# Patient Record
Sex: MALE | Race: BLACK OR AFRICAN AMERICAN | NOT HISPANIC OR LATINO | URBAN - METROPOLITAN AREA
[De-identification: names, ages, dates, MRNs, and addresses within clinical notes are randomized per-mention and may not be internally consistent; named-entity substitution may affect disease eponyms.]

---

## 2019-03-04 ENCOUNTER — OFFICE VISIT - HEALTHEAST (OUTPATIENT)
Dept: INTERNAL MEDICINE | Facility: CLINIC | Age: 29
End: 2019-03-04

## 2019-03-04 DIAGNOSIS — B00.9 HERPES SIMPLEX VIRUS INFECTION: ICD-10-CM

## 2019-03-04 DIAGNOSIS — N46.9 INFERTILITY MALE: ICD-10-CM

## 2019-03-04 DIAGNOSIS — Z00.00 HEALTHCARE MAINTENANCE: ICD-10-CM

## 2019-03-04 DIAGNOSIS — L91.0 KELOID SCAR: ICD-10-CM

## 2019-03-04 ASSESSMENT — MIFFLIN-ST. JEOR: SCORE: 2114.05

## 2019-03-05 ENCOUNTER — AMBULATORY - HEALTHEAST (OUTPATIENT)
Dept: LAB | Facility: CLINIC | Age: 29
End: 2019-03-05

## 2019-03-05 DIAGNOSIS — Z00.00 HEALTHCARE MAINTENANCE: ICD-10-CM

## 2019-03-05 LAB
ALBUMIN SERPL-MCNC: 3.9 G/DL (ref 3.5–5)
ALP SERPL-CCNC: 76 U/L (ref 45–120)
ALT SERPL W P-5'-P-CCNC: 13 U/L (ref 0–45)
ANION GAP SERPL CALCULATED.3IONS-SCNC: 11 MMOL/L (ref 5–18)
AST SERPL W P-5'-P-CCNC: 19 U/L (ref 0–40)
BILIRUB SERPL-MCNC: 0.5 MG/DL (ref 0–1)
BUN SERPL-MCNC: 12 MG/DL (ref 8–22)
CALCIUM SERPL-MCNC: 9.4 MG/DL (ref 8.5–10.5)
CHLORIDE BLD-SCNC: 101 MMOL/L (ref 98–107)
CHOLEST SERPL-MCNC: 118 MG/DL
CO2 SERPL-SCNC: 27 MMOL/L (ref 22–31)
CREAT SERPL-MCNC: 0.99 MG/DL (ref 0.7–1.3)
FASTING STATUS PATIENT QL REPORTED: YES
GFR SERPL CREATININE-BSD FRML MDRD: >60 ML/MIN/1.73M2
GLUCOSE BLD-MCNC: 74 MG/DL (ref 70–125)
HDLC SERPL-MCNC: 45 MG/DL
LDLC SERPL CALC-MCNC: 66 MG/DL
POTASSIUM BLD-SCNC: 3.7 MMOL/L (ref 3.5–5)
PROT SERPL-MCNC: 7.4 G/DL (ref 6–8)
SODIUM SERPL-SCNC: 139 MMOL/L (ref 136–145)
TRIGL SERPL-MCNC: 35 MG/DL

## 2019-03-06 ENCOUNTER — COMMUNICATION - HEALTHEAST (OUTPATIENT)
Dept: INTERNAL MEDICINE | Facility: CLINIC | Age: 29
End: 2019-03-06

## 2019-05-24 ENCOUNTER — OFFICE VISIT - HEALTHEAST (OUTPATIENT)
Dept: INTERNAL MEDICINE | Facility: CLINIC | Age: 29
End: 2019-05-24

## 2019-05-24 DIAGNOSIS — K13.0 ANGULAR CHEILITIS: ICD-10-CM

## 2019-05-24 RX ORDER — TRIAMCINOLONE ACETONIDE 1 MG/G
CREAM TOPICAL
Qty: 30 G | Refills: 0 | Status: SHIPPED | OUTPATIENT
Start: 2019-05-24

## 2019-05-24 RX ORDER — KETOCONAZOLE 20 MG/G
CREAM TOPICAL 2 TIMES DAILY
Qty: 15 G | Refills: 0 | Status: SHIPPED | OUTPATIENT
Start: 2019-05-24

## 2021-05-29 NOTE — PROGRESS NOTES
Office Visit   Javan Brewer   29 y.o. male    Date of Visit: 5/24/2019    Chief Complaint   Patient presents with     Abrasion     around mouth - feels like cuts        Assessment and Plan   1. Angular cheilitis  He has had a rash on the corners of his mouth for the last few weeks.  States he had a similar problem about a year ago and was given an cream which helped resolve it.  His exam is consistent with angular colitis and I will give him an anti-inflammatory and antifungal cream.  If his symptoms are not resolving over the next couple of weeks he will let us know and would consider having him see dermatology.  He is in agreement with this plan.  - ketoconazole (NIZORAL) 2 % cream; Apply topically 2 (two) times a day. Use twice daily for rash for next 2-3 weeks  Dispense: 15 g; Refill: 0  - triamcinolone (KENALOG) 0.1 % cream; Apply twice daily to affected area  Dispense: 30 g; Refill: 0         No follow-ups on file.     History of Present Illness   This 29 y.o. old male comes in with 2 weeks of a plaque-like rash at the angles of his mouth.  He is been using petroleum jelly and it does not seem to be improving.  He has no other concerns today.  He has no other chronic medical problems.  States he had a similar rash about a year ago and was given 2 creams which helped.  He does not recall what those were.    Review of Systems: As above, systems otherwise reviewed and negative.     Medications, Allergies and Problem List   There is no problem list on file for this patient.    Current Outpatient Medications   Medication Sig Dispense Refill     ketoconazole (NIZORAL) 2 % cream Apply topically 2 (two) times a day. Use twice daily for rash for next 2-3 weeks 15 g 0     triamcinolone (KENALOG) 0.1 % cream Apply twice daily to affected area 30 g 0     No current facility-administered medications for this visit.      No Known Allergies       Physical Exam     /74 (Patient Site: Right Arm, Patient Position:  Sitting, Cuff Size: Adult Large)   Pulse 68   Resp 14   Wt (!) 250 lb (113.4 kg)   SpO2 98%   BMI 34.38 kg/m      This is an alert, well-appearing male, sitting comfortably, no apparent distress.  Skin: He does have a plaque-like thickening at the angles of his mouth bilaterally.     Additional Information   Social History     Tobacco Use     Smoking status: Current Some Day Smoker     Smokeless tobacco: Never Used   Substance Use Topics     Alcohol use: Yes     Drug use: No          Bethany Benoit MD

## 2021-06-02 VITALS — HEIGHT: 72 IN | BODY MASS INDEX: 33.86 KG/M2 | WEIGHT: 250 LBS

## 2021-06-02 VITALS — BODY MASS INDEX: 34.38 KG/M2 | WEIGHT: 250 LBS

## 2021-06-18 NOTE — LETTER
Letter by Bob Patel MD at      Author: Bob Patel MD Service: -- Author Type: --    Filed:  Encounter Date: 3/6/2019 Status: (Other)       Javan Berwer  [address] [place] [address] [address] [id number]             March 6, 2019         Dear Fátima Brewer,    Below are the results from your recent visit:    Resulted Orders   Comprehensive Metabolic Panel   Result Value Ref Range    Sodium 139 136 - 145 mmol/L    Potassium 3.7 3.5 - 5.0 mmol/L    Chloride 101 98 - 107 mmol/L    CO2 27 22 - 31 mmol/L    Anion Gap, Calculation 11 5 - 18 mmol/L    Glucose 74 70 - 125 mg/dL    BUN 12 8 - 22 mg/dL    Creatinine 0.99 0.70 - 1.30 mg/dL    GFR MDRD Af Amer >60 >60 mL/min/1.73m2    GFR MDRD Non Af Amer >60 >60 mL/min/1.73m2    Bilirubin, Total 0.5 0.0 - 1.0 mg/dL    Calcium 9.4 8.5 - 10.5 mg/dL    Protein, Total 7.4 6.0 - 8.0 g/dL    Albumin 3.9 3.5 - 5.0 g/dL    Alkaline Phosphatase 76 45 - 120 U/L    AST 19 0 - 40 U/L    ALT 13 0 - 45 U/L    Narrative    Fasting Glucose reference range is 70-99 mg/dL per  American Diabetes Association (ADA) guidelines.   Lipid Cascade   Result Value Ref Range    Cholesterol 118 <=199 mg/dL    Triglycerides 35 <=149 mg/dL    HDL Cholesterol 45 >=40 mg/dL    LDL Calculated 66 <=129 mg/dL    Patient Fasting > 8hrs? Yes        Your blood tests all look very good.    Please call with questions or contact us using [employer]    Sincerely,        Electronically signed by Bob Patel MD

## 2021-06-24 NOTE — PROGRESS NOTES
[facility] Follow Up Note    Javan PERRY Osman   29 y.o. male    Date of Visit: 3/4/2019    Chief Complaint   Patient presents with     Annual Exam     pt not fasting     Subjective  This is a 29-year-old man who is in for the first time.  He comes in primarily to establish care and have a physical examination.  He has generally been in good health with no chronic or ongoing medical issues.  He does report that he has some ongoing back pain and would like to see a chiropractor.  I told him I had no objection to that.  Second minor issue is a keloid that he had surgery on the chest wall sometime ago.  It is now causing a little bit of itching although there is no open sore and no drainage.  Third minor issue was a cold sore on his lip.  He tells me that he gets these about once per year.  Final issue is that he and his fiancée have been trying to get pregnant and have been unsuccessful.  She apparently has gone through an evaluation and so far it has been negative.  He is asking about evaluation for himself.  No other issues and he is on no medication on a regular basis.    Past medical history: His only surgery has been on the keloid on the chest wall.  He has no other chronic medical issues.  No known drug allergies.    Social history: He does not smoke.  He works in construction.  As noted above he is currently engaged.    Family history: His mother is 47 years of age and has diabetes.  He has 2 younger sisters were in good health.    ROS A comprehensive review of systems was performed and was otherwise negative    Medications, allergies, and problem list were reviewed and updated    Exam  General Appearance:   On examination his blood pressure is 122/60.  Weight is 250 pounds and height is 71.5 inches.  BMI is 34.38.    Eyes: Pupils are equal and conjunctiva are normal.    Ears nose and throat: External ears canals and tympanic membranes are normal.  Nose and throat are normal.    Neck:  Supple with no masses and no neck vein distention.  No thyroid enlargement.    Lungs: Clear.    Cardiovascular: Heart is in a sinus rhythm with a rate of 78 and no ectopy.  No gallops or murmurs.  Carotid pulses are full.  No peripheral edema.    GI: Abdomen is soft and nontender with no distention.  No masses or organomegaly.    Musculoskeletal: Head and neck are normal to inspection with good range of motion.  Good strength and range of motion in all 4 extremities.    Neurologic: Cranial nerves are intact.  Gait is normal.    Skin: There is a moderate-sized keloid on the anterior chest wall.  No open areas and no drainage.    Psychiatric: The patient is alert and oriented x3.  Mood and affect are appropriate.      Assessment/Plan  1. Healthcare maintenance  Comprehensive Metabolic Panel    Lipid Cascade   2. Infertility male  Semen Analysis   3. Herpes simplex virus infection     4. Keloid scar       Overall the patient appears to be in good health.  I did put in future orders for CMP and lipids for screening when he is available to come in.    Back pain.  I did not have the name of the chiropractor give him but he will check on this.    Cold sore on the lip.  We will put him on some Valtrex for 3 days to see if we can get rid of this.    Keloid.  We will try some triamcinolone cream to the chest to see if this helps with the itching his symptoms.    Infertility evaluation.  We will put in an order for future semen analysis.  I did give him a container for the specimen and instructions.    I will plan to follow-up with him on all of these test results.  I will see him back as needed.  The following high BMI interventions were performed this visit: weight monitoring    Bob Patel MD      No current outpatient medications on file prior to visit.     No current facility-administered medications on file prior to visit.      No Known Allergies  Social History     Tobacco Use     Smoking status: Never Smoker      Smokeless tobacco: Never Used   Substance Use Topics     Alcohol use: Yes     Drug use: No

## 2021-08-15 ENCOUNTER — HEALTH MAINTENANCE LETTER (OUTPATIENT)
Age: 31
End: 2021-08-15

## 2021-10-11 ENCOUNTER — HEALTH MAINTENANCE LETTER (OUTPATIENT)
Age: 31
End: 2021-10-11

## 2022-09-25 ENCOUNTER — HEALTH MAINTENANCE LETTER (OUTPATIENT)
Age: 32
End: 2022-09-25

## 2023-10-14 ENCOUNTER — HEALTH MAINTENANCE LETTER (OUTPATIENT)
Age: 33
End: 2023-10-14